# Patient Record
Sex: FEMALE | Race: WHITE | NOT HISPANIC OR LATINO | Employment: FULL TIME | URBAN - NONMETROPOLITAN AREA
[De-identification: names, ages, dates, MRNs, and addresses within clinical notes are randomized per-mention and may not be internally consistent; named-entity substitution may affect disease eponyms.]

---

## 2019-01-13 ENCOUNTER — APPOINTMENT (OUTPATIENT)
Dept: GENERAL RADIOLOGY | Facility: HOSPITAL | Age: 44
End: 2019-01-13

## 2019-01-13 ENCOUNTER — HOSPITAL ENCOUNTER (EMERGENCY)
Facility: HOSPITAL | Age: 44
Discharge: HOME | End: 2019-01-13
Attending: EMERGENCY MEDICINE | Admitting: EMERGENCY MEDICINE

## 2019-01-13 VITALS
SYSTOLIC BLOOD PRESSURE: 156 MMHG | WEIGHT: 270.7 LBS | TEMPERATURE: 97.8 F | HEART RATE: 89 BPM | BODY MASS INDEX: 37.9 KG/M2 | DIASTOLIC BLOOD PRESSURE: 98 MMHG | RESPIRATION RATE: 18 BRPM | HEIGHT: 71 IN | OXYGEN SATURATION: 100 %

## 2019-01-13 DIAGNOSIS — S83.92XA SPRAIN OF LEFT KNEE, UNSPECIFIED LIGAMENT, INITIAL ENCOUNTER: ICD-10-CM

## 2019-01-13 DIAGNOSIS — S80.02XA CONTUSION OF LEFT KNEE, INITIAL ENCOUNTER: Primary | ICD-10-CM

## 2019-01-13 PROCEDURE — 73564 X-RAY EXAM KNEE 4 OR MORE: CPT

## 2019-01-13 PROCEDURE — 73590 X-RAY EXAM OF LOWER LEG: CPT

## 2019-01-13 PROCEDURE — 99283 EMERGENCY DEPT VISIT LOW MDM: CPT

## 2019-01-13 RX ORDER — HYDROCODONE BITARTRATE AND ACETAMINOPHEN 7.5; 325 MG/1; MG/1
1 TABLET ORAL ONCE
Status: DISCONTINUED | OUTPATIENT
Start: 2019-01-13 | End: 2019-01-13 | Stop reason: HOSPADM

## 2019-01-13 RX ORDER — FERROUS SULFATE 325(65) MG
325 TABLET ORAL
COMMUNITY

## 2019-01-13 RX ORDER — HYDROCODONE BITARTRATE AND ACETAMINOPHEN 5; 325 MG/1; MG/1
1 TABLET ORAL EVERY 6 HOURS PRN
Qty: 8 TABLET | Refills: 0 | Status: SHIPPED | OUTPATIENT
Start: 2019-01-13

## 2019-01-13 RX ORDER — OMEPRAZOLE 20 MG/1
20 CAPSULE, DELAYED RELEASE ORAL DAILY
COMMUNITY

## 2019-01-14 NOTE — ED PROVIDER NOTES
Subjective   44yo female presents ED c/o left knee pain s/p slip et fall while ambulating level surface while at work today.  Pt c/o painful partial wt bearing LLE.  ROS otherwise noncontributory.        Lower Extremity Issue   Location:  Knee  Knee location:  L knee  Pain details:     Quality:  Sharp    Radiates to:  Does not radiate    Onset quality:  Sudden  Chronicity:  New  Dislocation: no        Review of Systems   Constitutional: Negative.    HENT: Negative.    Respiratory: Negative.    Cardiovascular: Negative.    Gastrointestinal: Negative.    Musculoskeletal: Positive for arthralgias and gait problem.   Skin: Negative.    All other systems reviewed and are negative.      No past medical history on file.    Allergies   Allergen Reactions   • Darvon [Propoxyphene] Hives   • Motrin [Ibuprofen] Hives       No past surgical history on file.    No family history on file.    Social History     Socioeconomic History   • Marital status:      Spouse name: Not on file   • Number of children: Not on file   • Years of education: Not on file   • Highest education level: Not on file           Objective   Physical Exam   Constitutional: She is oriented to person, place, and time. She appears well-developed and well-nourished.   HENT:   Head: Normocephalic and atraumatic.   Mouth/Throat: Oropharynx is clear and moist.   Eyes: Pupils are equal, round, and reactive to light.   Neck: Normal range of motion.   Cardiovascular: Regular rhythm, S1 normal and S2 normal.   Murmur heard.   Systolic murmur is present with a grade of 3/6.  Pulmonary/Chest: Effort normal and breath sounds normal. She has no wheezes. She has no rales.   Abdominal: Soft. Bowel sounds are normal. She exhibits no mass. There is no tenderness. There is no rebound and no guarding.   Musculoskeletal: She exhibits edema and tenderness. She exhibits no deformity.        Left knee: She exhibits decreased range of motion, swelling and ecchymosis. She  exhibits no effusion, no deformity, no laceration, no erythema, normal alignment, no LCL laxity, normal patellar mobility and no MCL laxity. Tenderness found. Patellar tendon tenderness noted.        Legs:  Df/pf intact. Dp/pt 2+ palpable. Sensation grossly intact.   Neurological: She is alert and oriented to person, place, and time.   Skin: Skin is warm and dry.   Nursing note and vitals reviewed.      Procedures           ED Course  ED Course as of Jan 13 1906   Sun Jan 13, 2019   1904 [facility]# [id number] reviewed. Nsaids contraindicated secondary to ibuprofen allergy.  [SD]      ED Course User Index  [SD] Sreedhar Jerry MD        Xr Knee 4+ View Left    Result Date: 1/13/2019  Narrative: Four view left knee HISTORY: Pain after falling AP, lateral, tunnel and oblique views obtained. COMPARISON: None No fracture or dislocation. Small patellar spurs. No suprapatellar effusion. No other osseous or articular abnormality.     Impression: CONCLUSION: No fracture or dislocation [id number] Electronically signed by:  Sav Paige MD  1/13/2019 7:02 PM CST Workstation: [id number]    Xr Tibia Fibula 2 View Left    Result Date: 1/13/2019  Narrative: Two view left leg HISTORY: Pain after falling AP and lateral views obtained. COMPARISON: None No fracture or dislocation. Calcaneal spur at insertion of Achilles tendon. No other osseous or articular abnormality.     Impression: CONCLUSION: No fracture or dislocation [id number] Electronically signed by:  Sav Paige MD  1/13/2019 6:58 PM CST Workstation: [email or web address]                [facility]      Final diagnoses:   Contusion of left knee, initial encounter   Sprain of left knee, unspecified ligament, initial encounter            Sreedhar Jerry MD  01/13/19 1906

## 2019-01-14 NOTE — DISCHARGE INSTRUCTIONS
Non weight bearing left leg pending clinic followup  Return ED worse condition, other concerns  Knee immobilizer/crutches as directed

## 2019-01-14 NOTE — ED NOTES
"20\" knee immobilizer applied. Crutches training complete. Pt understood.      Elsa Bello, RN  01/13/19 1956    "